# Patient Record
Sex: FEMALE | HISPANIC OR LATINO | Employment: FULL TIME | URBAN - NONMETROPOLITAN AREA
[De-identification: names, ages, dates, MRNs, and addresses within clinical notes are randomized per-mention and may not be internally consistent; named-entity substitution may affect disease eponyms.]

---

## 2017-06-27 ENCOUNTER — HISTORY (OUTPATIENT)
Dept: EMERGENCY MEDICINE | Facility: OTHER | Age: 52
End: 2017-06-27

## 2017-07-15 ENCOUNTER — HEALTH MAINTENANCE LETTER (OUTPATIENT)
Age: 52
End: 2017-07-15

## 2017-08-08 ENCOUNTER — OFFICE VISIT - GICH (OUTPATIENT)
Dept: FAMILY MEDICINE | Facility: OTHER | Age: 52
End: 2017-08-08

## 2017-08-08 ENCOUNTER — HISTORY (OUTPATIENT)
Dept: FAMILY MEDICINE | Facility: OTHER | Age: 52
End: 2017-08-08

## 2017-08-08 ENCOUNTER — HOSPITAL ENCOUNTER (OUTPATIENT)
Dept: RADIOLOGY | Facility: OTHER | Age: 52
End: 2017-08-08
Attending: PHYSICIAN ASSISTANT

## 2017-08-08 DIAGNOSIS — M54.50 LOW BACK PAIN: ICD-10-CM

## 2017-08-08 DIAGNOSIS — M25.512 PAIN IN LEFT SHOULDER: ICD-10-CM

## 2017-08-08 DIAGNOSIS — M79.645 PAIN OF FINGER OF LEFT HAND: ICD-10-CM

## 2017-08-08 ASSESSMENT — PATIENT HEALTH QUESTIONNAIRE - PHQ9: SUM OF ALL RESPONSES TO PHQ QUESTIONS 1-9: 11
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
## 2017-12-28 NOTE — PATIENT INSTRUCTIONS
Patient Information     Patient Name MRN Sex Wilma Romeo [id number] Female 1965      Patient Instructions by Azalia Art PA-C at 2017  3:45 PM     Author:  Azalia Art PA-C  Service:  (none) Author Type:  PHYS- Physician Assistant     Filed:  2017  4:48 PM  Encounter Date:  2017 Status:  Addendum     :  Azalia Art PA-C (PHYS- Physician Assistant)        Related Notes: Original Note by Azalia Art PA-C (PHYS- Physician Assistant) filed at 2017  4:47 PM            Finger pain:   Encouraged to take ibuprofen (400-600mg) for relief up to 4 times per day.  Encouraged rest and elevation.  Encouraged to use ice or heat 15 minutes at a time several times per day to decrease pain. Return to clinic in 2 weeks as necessary for persistent pain. Return to clinic with any change or worsening of symptoms.    Shoulder pain and back pain:  Encouraged to take ibuprofen (400-600mg) for relief up to 4 times per day.  Encouraged rest and elevation.  Encouraged to use ice or heat 15 minutes at a time several times per day to decrease pain. Return to clinic in 2 weeks as necessary for persistent pain. Return to clinic with any change or worsening of symptoms.  Referred to physical therapy for a consult.   Encouraged stretching exercises.
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
## 2018-01-27 VITALS
SYSTOLIC BLOOD PRESSURE: 100 MMHG | DIASTOLIC BLOOD PRESSURE: 68 MMHG | BODY MASS INDEX: 15.27 KG/M2 | HEIGHT: 62 IN | HEART RATE: 84 BPM | WEIGHT: 83 LBS

## 2018-01-29 ASSESSMENT — PATIENT HEALTH QUESTIONNAIRE - PHQ9: SUM OF ALL RESPONSES TO PHQ QUESTIONS 1-9: 11

## 2018-03-08 ENCOUNTER — DOCUMENTATION ONLY (OUTPATIENT)
Dept: FAMILY MEDICINE | Facility: OTHER | Age: 53
End: 2018-03-08

## 2018-03-08 PROBLEM — F10.10 ALCOHOL ABUSE: Status: ACTIVE | Noted: 2018-03-08

## 2018-03-08 RX ORDER — LAMOTRIGINE 200 MG/1
200 TABLET ORAL DAILY
COMMUNITY
Start: 2017-08-08

## 2018-03-08 RX ORDER — LANOLIN ALCOHOL/MO/W.PET/CERES
6 CREAM (GRAM) TOPICAL
COMMUNITY

## 2018-03-08 RX ORDER — PAROXETINE 40 MG/1
40 TABLET, FILM COATED ORAL EVERY MORNING
COMMUNITY

## 2018-03-08 RX ORDER — TRAZODONE HYDROCHLORIDE 100 MG/1
100 TABLET ORAL AT BEDTIME
COMMUNITY

## 2018-03-08 RX ORDER — ACETAMINOPHEN 325 MG/1
325-650 TABLET ORAL EVERY 4 HOURS PRN
COMMUNITY

## 2018-03-08 RX ORDER — IBUPROFEN 200 MG
200-400 TABLET ORAL EVERY 4 HOURS PRN
COMMUNITY

## 2018-03-08 RX ORDER — FOLIC ACID 1 MG/1
2 TABLET ORAL DAILY
COMMUNITY

## 2018-03-08 RX ORDER — BUSPIRONE HYDROCHLORIDE 10 MG/1
20 TABLET ORAL 3 TIMES DAILY
COMMUNITY

## 2018-03-08 RX ORDER — GUAIFENESIN/DEXTROMETHORPHAN 100-10MG/5
10 SYRUP ORAL EVERY 4 HOURS PRN
COMMUNITY

## 2018-03-08 RX ORDER — DIPHENOXYLATE HYDROCHLORIDE AND ATROPINE SULFATE 2.5; .025 MG/1; MG/1
1 TABLET ORAL DAILY
COMMUNITY

## 2019-09-25 ENCOUNTER — HOSPITAL ENCOUNTER (OUTPATIENT)
Dept: GENERAL RADIOLOGY | Facility: OTHER | Age: 54
Discharge: HOME | End: 2019-09-25
Attending: FAMILY MEDICINE | Admitting: FAMILY MEDICINE
Payer: COMMERCIAL

## 2019-09-25 ENCOUNTER — OFFICE VISIT (OUTPATIENT)
Dept: FAMILY MEDICINE | Facility: OTHER | Age: 54
End: 2019-09-25
Attending: FAMILY MEDICINE
Payer: COMMERCIAL

## 2019-09-25 VITALS
SYSTOLIC BLOOD PRESSURE: 124 MMHG | DIASTOLIC BLOOD PRESSURE: 72 MMHG | HEART RATE: 92 BPM | RESPIRATION RATE: 16 BRPM | TEMPERATURE: 98.3 F | BODY MASS INDEX: 19.84 KG/M2 | HEIGHT: 62 IN | WEIGHT: 107.8 LBS | OXYGEN SATURATION: 97 %

## 2019-09-25 DIAGNOSIS — G89.29 CHRONIC LEFT SHOULDER PAIN: ICD-10-CM

## 2019-09-25 DIAGNOSIS — M25.512 CHRONIC LEFT SHOULDER PAIN: ICD-10-CM

## 2019-09-25 DIAGNOSIS — J20.9 ACUTE BRONCHITIS, UNSPECIFIED ORGANISM: Primary | ICD-10-CM

## 2019-09-25 PROCEDURE — 99214 OFFICE O/P EST MOD 30 MIN: CPT | Performed by: FAMILY MEDICINE

## 2019-09-25 PROCEDURE — G0463 HOSPITAL OUTPT CLINIC VISIT: HCPCS

## 2019-09-25 PROCEDURE — 73030 X-RAY EXAM OF SHOULDER: CPT | Mod: LT

## 2019-09-25 PROCEDURE — G0463 HOSPITAL OUTPT CLINIC VISIT: HCPCS | Mod: 25

## 2019-09-25 RX ORDER — AZITHROMYCIN 250 MG/1
TABLET, FILM COATED ORAL
Qty: 6 TABLET | Refills: 0 | Status: SHIPPED | OUTPATIENT
Start: 2019-09-25 | End: 2019-12-31

## 2019-09-25 RX ORDER — IBUPROFEN 200 MG
200 TABLET ORAL EVERY 4 HOURS PRN
COMMUNITY
End: 2019-12-31

## 2019-09-25 RX ORDER — BUSPIRONE HYDROCHLORIDE 30 MG/1
30 TABLET ORAL 2 TIMES DAILY
Refills: 3 | COMMUNITY
Start: 2019-07-16 | End: 2019-12-31

## 2019-09-25 RX ORDER — BUPROPION HYDROCHLORIDE 150 MG/1
1 TABLET ORAL DAILY
Refills: 0 | COMMUNITY
Start: 2019-09-13 | End: 2019-12-31

## 2019-09-25 RX ORDER — PAROXETINE 40 MG/1
40 TABLET, FILM COATED ORAL DAILY
Refills: 4 | COMMUNITY
Start: 2019-09-13 | End: 2019-12-31

## 2019-09-25 RX ORDER — LAMOTRIGINE 100 MG/1
2 TABLET ORAL DAILY
Refills: 4 | COMMUNITY
Start: 2019-07-16 | End: 2019-12-31

## 2019-09-25 ASSESSMENT — PATIENT HEALTH QUESTIONNAIRE - PHQ9
5. POOR APPETITE OR OVEREATING: NOT AT ALL
SUM OF ALL RESPONSES TO PHQ QUESTIONS 1-9: 7

## 2019-09-25 ASSESSMENT — ANXIETY QUESTIONNAIRES
GAD7 TOTAL SCORE: 7
6. BECOMING EASILY ANNOYED OR IRRITABLE: NOT AT ALL
3. WORRYING TOO MUCH ABOUT DIFFERENT THINGS: SEVERAL DAYS
IF YOU CHECKED OFF ANY PROBLEMS ON THIS QUESTIONNAIRE, HOW DIFFICULT HAVE THESE PROBLEMS MADE IT FOR YOU TO DO YOUR WORK, TAKE CARE OF THINGS AT HOME, OR GET ALONG WITH OTHER PEOPLE: SOMEWHAT DIFFICULT
7. FEELING AFRAID AS IF SOMETHING AWFUL MIGHT HAPPEN: SEVERAL DAYS
5. BEING SO RESTLESS THAT IT IS HARD TO SIT STILL: NOT AT ALL
1. FEELING NERVOUS, ANXIOUS, OR ON EDGE: NEARLY EVERY DAY
2. NOT BEING ABLE TO STOP OR CONTROL WORRYING: MORE THAN HALF THE DAYS

## 2019-09-25 ASSESSMENT — MIFFLIN-ST. JEOR: SCORE: 1044.29

## 2019-09-25 ASSESSMENT — PAIN SCALES - GENERAL: PAINLEVEL: MODERATE PAIN (5)
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
## 2019-09-26 ASSESSMENT — ANXIETY QUESTIONNAIRES: GAD7 TOTAL SCORE: 7

## 2019-11-03 ENCOUNTER — HEALTH MAINTENANCE LETTER (OUTPATIENT)
Age: 54
End: 2019-11-03

## 2019-12-31 ENCOUNTER — OFFICE VISIT (OUTPATIENT)
Dept: FAMILY MEDICINE | Facility: OTHER | Age: 54
End: 2019-12-31
Attending: NURSE PRACTITIONER
Payer: COMMERCIAL

## 2019-12-31 VITALS
TEMPERATURE: 97.3 F | HEIGHT: 62 IN | WEIGHT: 109 LBS | RESPIRATION RATE: 18 BRPM | HEART RATE: 82 BPM | OXYGEN SATURATION: 98 % | SYSTOLIC BLOOD PRESSURE: 110 MMHG | DIASTOLIC BLOOD PRESSURE: 74 MMHG | BODY MASS INDEX: 20.06 KG/M2

## 2019-12-31 DIAGNOSIS — M25.561 ACUTE PAIN OF RIGHT KNEE: Primary | ICD-10-CM

## 2019-12-31 DIAGNOSIS — S83.421A SPRAIN OF LATERAL COLLATERAL LIGAMENT OF RIGHT KNEE, INITIAL ENCOUNTER: ICD-10-CM

## 2019-12-31 PROCEDURE — G0463 HOSPITAL OUTPT CLINIC VISIT: HCPCS

## 2019-12-31 PROCEDURE — 99213 OFFICE O/P EST LOW 20 MIN: CPT | Performed by: NURSE PRACTITIONER

## 2019-12-31 ASSESSMENT — ENCOUNTER SYMPTOMS
ARTHRALGIAS: 1
JOINT SWELLING: 1

## 2019-12-31 ASSESSMENT — MIFFLIN-ST. JEOR: SCORE: 1049.73

## 2019-12-31 ASSESSMENT — PAIN SCALES - GENERAL: PAINLEVEL: SEVERE PAIN (7)

## 2019-12-31 NOTE — PROGRESS NOTES
"  SUBJECTIVE:   Karen Sharpe is a 54 year old female who presents to clinic today for the following health issues:    HPI  Patient presents for evaluation of right knee pain. She slipped and came down on right knee 2 weeks. Had immediate pain, but was able to walk on it. She noted some minor swelling to the right of her knee. Pain is worse with activity and better with rest. She reports no giving away or instability of her knee. No prior surgery or injury.     There are no active problems to display for this patient.    Review of Systems   Musculoskeletal: Positive for arthralgias, gait problem and joint swelling.        OBJECTIVE:     /74 (BP Location: Right arm, Patient Position: Sitting, Cuff Size: Adult Regular)   Pulse 82   Temp 97.3  F (36.3  C) (Tympanic)   Resp 18   Ht 1.562 m (5' 1.5\")   Wt 49.4 kg (109 lb)   LMP  (LMP Unknown)   SpO2 98%   Breastfeeding No   BMI 20.26 kg/m    Body mass index is 20.26 kg/m .  Physical Exam  Constitutional:       Appearance: Normal appearance. She is normal weight.   Musculoskeletal:      Right knee: She exhibits decreased range of motion. She exhibits no swelling, no effusion, no ecchymosis, no erythema, normal alignment and no MCL laxity. Tenderness found. LCL tenderness noted. No medial joint line, no lateral joint line, no MCL and no patellar tendon tenderness noted.   Neurological:      Mental Status: She is alert.     Pain to lateral knee with extension of knee.     Diagnostic Test Results:  none     ASSESSMENT/PLAN:   1. Acute pain of right knee  - ELASTIC KNEE SUPPORT S  - PHYSICAL THERAPY REFERRAL; Future    2. Sprain of lateral collateral ligament of right knee, initial encounter  Due to length of injury and exam I did not feel x-ray of knee was necessary at this time, likely ligament sprain. Plan on elastic knee support for the next 2 weeks and consider physical therapy. Ice at the end of the day to help with inflammation. If not improved in the " next 2-3 weeks consider further imaging.     Monica Sarmiento FNP-[facility] AND [facility]

## 2019-12-31 NOTE — NURSING NOTE
Patient presents to clinic after fall onto right knee.  She is experiencing swelling and pain rated 7.    Rupa Mendoza LPN............12/31/2019 3:47 PM

## 2020-05-16 ENCOUNTER — VIRTUAL VISIT (OUTPATIENT)
Dept: FAMILY MEDICINE | Facility: OTHER | Age: 55
End: 2020-05-16

## 2020-05-17 NOTE — PROGRESS NOTES
"Date: 2020 20:59:28  Clinician: Jessy Pink  Clinician NPI: [id number]  Patient: Karen Sharpe  Patient : 1965  Patient Address: [address] [place] [address]  Patient Phone: [phone]  Visit Protocol: UTI  Patient Summary:  Karen is a 54 year old ( : 1965 ) female who initiated a Visit for a presumed bladder infection. When asked the question \"Please sign me up to receive news, health information and promotions. \", Karen responded \"No\".   Her symptoms started 1-3 days ago and consist of dysuria, feeling as if the bladder is never empty, urinary frequency, and urgency.   Symptom details   Urine color: The color of her urine is yellow.    Denied symptoms include foul-smelling urine, nausea, flank pain, abdominal pain, chills, vaginal discharge, urinary incontinence, vomiting, and vaginal itching. She does not feel feverish.   Karen has not used any over-the-counter medications or home remedies to relieve her current symptoms.  Precipitating events  Karen denies having a sexually transmitted disease.  Pertinent medical history  Karen has had a bladder infection before but has not had any in the past 12 months. Her current symptoms are similar to her previous bladder infection symptoms.   She is not sure what antibiotics have been effective in treating her past bladder infections.   Karen has not been prescribed antibiotics to prevent frequent or repeated bladder infections in the past and does not get yeast infections when she takes antibiotics. She has not experienced problems or side effects with any of the common antibiotics used to treat bladder infections.   Karen does not have a history of kidney stones. She has not used a catheter or been a patient in a hospital or nursing home in the past 2 weeks.   Karen smokes or uses smokeless tobacco.     MEDICATIONS: No current medications, ALLERGIES: NKDA  Clinician Response:  Dear Karen,  Based on the information you have provided, you " likely have an acute urinary tract infection, also called a bladder infection. Bladder infections occur when bacteria from the outside of the body enters the urinary tract. Any part of the urinary system can be infected, but the bladder is the most common.  Medication information  I am prescribing:     Nitrofurantoin monohyd/m-cryst (Macrobid) 100 mg oral capsule. Take 1 capsule by mouth every 12 hours for 5 days. Take this medication with food. There are no refills with this prescription.   The medication I prescribed for your bladder infection is an antibiotic. Continue taking the medication until it is gone even if you feel better.   Yeast infections can be a common side effect of antibiotics. The most common symptom of a yeast infection is itchiness in and around the vagina. Other signs and symptoms include burning, redness, or a thick, white vaginal discharge that looks like cottage cheese and does not have a bad smell.  Self care  Urination helps to flush bacteria from the urinary tract. For this reason, drinking water and urinating often helps relieve some urinary symptoms and can decrease your risk of getting bladder infections in the future.  Other steps you can take to prevent future bladder infections include:     Wipe front to back after using the bathroom    Urinate after sexual intercourse    Avoid using deodorant sprays, douches, or powders in the vaginal area     Becoming tobacco-free is one of the best things you can do to improve your health. For help with quitting tobacco, you can call 1-489-QUIT-NOW (1-654.680.9890) or visit smokefree.gov.  When to seek care  Please make an appointment to be seen in a clinic or urgent care if any of the following occur:     You develop new symptoms or your symptoms become worse    You have medication side effects that make it difficult to take them as prescribed    Your symptoms do not improve within 1-2 days of starting treatment    You have symptoms of a  bladder infection that return shortly after completing treatment     It is possible to have an allergic reaction to an antibiotic even if you have not had one in the past. If you notice a new rash, significant swelling, or difficulty breathing, stop taking this medication immediately and go to a clinic or urgent care.   Diagnosis: Acute uncomplicated bladder infection  Diagnosis ICD: N39.0  Prescription: nitrofurantoin monohyd/m-cryst (Macrobid) 100 mg oral capsule 10 capsule, 5 days supply. Take 1 capsule by mouth every 12 hours for 5 days. Refills: 0, Refill as needed: no, Allow substitutions: yes  Pharmacy: [facility] #[id number] - [phone] - [address] [address]

## 2020-11-16 ENCOUNTER — HEALTH MAINTENANCE LETTER (OUTPATIENT)
Age: 55
End: 2020-11-16

## 2021-02-07 ENCOUNTER — HEALTH MAINTENANCE LETTER (OUTPATIENT)
Age: 56
End: 2021-02-07

## 2021-09-18 ENCOUNTER — HEALTH MAINTENANCE LETTER (OUTPATIENT)
Age: 56
End: 2021-09-18

## 2022-01-02 ENCOUNTER — HEALTH MAINTENANCE LETTER (OUTPATIENT)
Age: 57
End: 2022-01-02

## 2022-02-27 ENCOUNTER — HEALTH MAINTENANCE LETTER (OUTPATIENT)
Age: 57
End: 2022-02-27

## 2022-11-19 ENCOUNTER — HEALTH MAINTENANCE LETTER (OUTPATIENT)
Age: 57
End: 2022-11-19

## 2023-04-09 ENCOUNTER — HEALTH MAINTENANCE LETTER (OUTPATIENT)
Age: 58
End: 2023-04-09

## 2024-09-28 ENCOUNTER — HOSPITAL ENCOUNTER (EMERGENCY)
Facility: OTHER | Age: 59
Discharge: HOME | End: 2024-09-28
Attending: EMERGENCY MEDICINE

## 2024-09-28 ENCOUNTER — APPOINTMENT (OUTPATIENT)
Dept: GENERAL RADIOLOGY | Facility: OTHER | Age: 59
End: 2024-09-28
Attending: EMERGENCY MEDICINE

## 2024-09-28 VITALS
DIASTOLIC BLOOD PRESSURE: 55 MMHG | RESPIRATION RATE: 18 BRPM | HEIGHT: 62 IN | OXYGEN SATURATION: 99 % | SYSTOLIC BLOOD PRESSURE: 105 MMHG | HEART RATE: 88 BPM | TEMPERATURE: 97.8 F | WEIGHT: 105 LBS | BODY MASS INDEX: 19.32 KG/M2

## 2024-09-28 DIAGNOSIS — S52.502A CLOSED FRACTURE OF DISTAL END OF LEFT RADIUS, UNSPECIFIED FRACTURE MORPHOLOGY, INITIAL ENCOUNTER: ICD-10-CM

## 2024-09-28 PROCEDURE — 99284 EMERGENCY DEPT VISIT MOD MDM: CPT | Mod: 25 | Performed by: EMERGENCY MEDICINE

## 2024-09-28 PROCEDURE — 29125 APPL SHORT ARM SPLINT STATIC: CPT | Performed by: EMERGENCY MEDICINE

## 2024-09-28 PROCEDURE — 99283 EMERGENCY DEPT VISIT LOW MDM: CPT | Mod: 25 | Performed by: EMERGENCY MEDICINE

## 2024-09-28 PROCEDURE — 250N000011 HC RX IP 250 OP 636: Performed by: EMERGENCY MEDICINE

## 2024-09-28 PROCEDURE — 73110 X-RAY EXAM OF WRIST: CPT | Mod: LT

## 2024-09-28 PROCEDURE — 96372 THER/PROPH/DIAG INJ SC/IM: CPT | Performed by: EMERGENCY MEDICINE

## 2024-09-28 RX ORDER — HYDROCODONE BITARTRATE AND ACETAMINOPHEN 5; 325 MG/1; MG/1
1 TABLET ORAL EVERY 6 HOURS PRN
Qty: 6 TABLET | Refills: 0 | Status: SHIPPED | OUTPATIENT
Start: 2024-09-28

## 2024-09-28 RX ORDER — MORPHINE SULFATE 4 MG/ML
4 INJECTION, SOLUTION INTRAMUSCULAR; INTRAVENOUS ONCE
Status: DISCONTINUED | OUTPATIENT
Start: 2024-09-28 | End: 2024-09-28

## 2024-09-28 RX ORDER — MORPHINE SULFATE 4 MG/ML
4 INJECTION, SOLUTION INTRAMUSCULAR; INTRAVENOUS ONCE
Status: COMPLETED | OUTPATIENT
Start: 2024-09-28 | End: 2024-09-28

## 2024-09-28 RX ADMIN — MORPHINE SULFATE 4 MG: 4 INJECTION, SOLUTION INTRAMUSCULAR; INTRAVENOUS at 15:29

## 2024-09-28 ASSESSMENT — ENCOUNTER SYMPTOMS
NAUSEA: 0
ARTHRALGIAS: 1
AGITATION: 0
FEVER: 0
CHILLS: 0
CHEST TIGHTNESS: 0
SHORTNESS OF BREATH: 0
LIGHT-HEADEDNESS: 0
DYSURIA: 0
VOMITING: 0
WOUND: 0

## 2024-09-28 ASSESSMENT — COLUMBIA-SUICIDE SEVERITY RATING SCALE - C-SSRS
6. HAVE YOU EVER DONE ANYTHING, STARTED TO DO ANYTHING, OR PREPARED TO DO ANYTHING TO END YOUR LIFE?: NO
1. IN THE PAST MONTH, HAVE YOU WISHED YOU WERE DEAD OR WISHED YOU COULD GO TO SLEEP AND NOT WAKE UP?: NO
2. HAVE YOU ACTUALLY HAD ANY THOUGHTS OF KILLING YOURSELF IN THE PAST MONTH?: NO

## 2024-09-28 ASSESSMENT — ACTIVITIES OF DAILY LIVING (ADL): ADLS_ACUITY_SCORE: 35

## 2024-09-28 NOTE — ED TRIAGE NOTES
Pt states she fell down a few stairs last night as she was helping her  up hit her head on the bottom of the stairway that was carpeted, bruises and bleeding to her nose, pt complains of both arms hurting along with being dizzy. Acknowledges alcohol use last evening.     Triage Assessment (Adult)       Row Name 09/28/24 1511          Triage Assessment    Airway WDL WDL        Respiratory WDL    Respiratory WDL WDL        Peripheral/Neurovascular WDL    Peripheral Neurovascular WDL WDL        Cognitive/Neuro/Behavioral WDL    Cognitive/Neuro/Behavioral WDL WDL

## 2024-09-28 NOTE — ED PROVIDER NOTES
History     Chief Complaint   Patient presents with    Fall     Pt states she was helping her  get up and she fell down a few stairs and hit her head on the bottom floor which was carpeted, this happened last evening she went to bed and decided she should go in because she was in pain and dizzy.     HPI  Karen Sharpe is a 59 year old female who comes in complaining mostly of left wrist pain.  Last night when she and her  were going to bed, she was helping him up the stairs when she lost her balance and fell back down the stairs.  They were perhaps 6 steps up from the bottom.  She turned and fell face first.  She wanted hitting her face has an abrasion around her nose and had nosebleed.  She did not lose consciousness.  She was able to get up and go back upstairs and went to bed.  She slept normally.  This morning when she woke up she was having this pain mostly in the left wrist but also some in the right wrist.  She states she has been been unable to control the pain.  She has taken ibuprofen 400 mg 3 times today for total of 1200 mg.  Has not provided any relief.  Pain is starting to radiate up towards her elbow.  Her fingers on her left hand are numb and tingly.  She states she did not lose consciousness.  She has been mentating clearly.  Slept normally and woke up easily.  No nausea or vomiting.  She had 1 slight episode of lightheadedness on her way in here but that has resolved.    Allergies:  Allergies   Allergen Reactions    Compazine [Prochlorperazine] Other (See Comments)     Jaw goes out of joint.    Oxycodone Nausea and Unknown    Prochlorperazine      Other reaction(s): Extrapyramidal Side Effect       Problem List:    Patient Active Problem List    Diagnosis Date Noted    Alcohol abuse 03/08/2018     Priority: Medium    Alcohol intoxication delirium with moderate or severe use disorder (H) 11/22/2016     Priority: Medium    Generalized anxiety disorder 10/06/2015     Priority: Medium      Diagnosis updated by automated process. Provider to review and confirm.      Dysmenorrhea 2013     Priority: Medium    Dyspareunia 2013     Priority: Medium    Anxiety 2013     Priority: Medium    Mild major depression (H) 2013     Priority: Medium    Fibroadenoma of right breast 2013     Priority: Medium     Mammogram done  and biopsy confirmed diagnosis, benign breast lump right breast 7 o'clock.  Luz Sandhu FNRAUL        Iron deficiency anemia 2011     Priority: Medium    Tick bite 2011     Priority: Medium    Weakness 2011     Priority: Medium     Diagnosis updated by automated process. Provider to review and confirm.      Joint ache 2011     Priority: Medium    CARDIOVASCULAR SCREENING; LDL GOAL LESS THAN 160 10/31/2010     Priority: Medium    Seizure disorder (H) 2009     Priority: Medium     Resolved one time only in    No more since then   Jhonny Bernabe MD          Past Medical History:    No past medical history on file.    Past Surgical History:    No past surgical history on file.    Family History:    No family history on file.    Social History:  Marital Status:   [2]  Social History     Tobacco Use    Smoking status: Former     Current packs/day: 0.00     Average packs/day: 0.3 packs/day for 15.0 years (3.8 ttl pk-yrs)     Types: Cigarettes     Start date: 1989     Quit date: 2004     Years since quittin.0    Smokeless tobacco: Never   Substance Use Topics    Alcohol use: Not Currently     Comment: Alcoholic Drinks/day: drinks [place] states takes sips    Drug use: Never     Types: Other     Comment: Drug use: No        Medications:    acetaminophen (TYLENOL) 325 MG tablet  busPIRone (BUSPAR) 10 MG tablet  calcium carbonate antacid (PX ANTACID MAXIMUM STRENGTH) 1000 MG CHEW  diazepam (VALIUM) 5 MG tablet  diclofenac (VOLTAREN) 1 % GEL  folic acid (FOLVITE) 1 MG tablet  guaiFENesin-dextromethorphan  "(ROBITUSSIN DM) 100-10 MG/5ML syrup  ibuprofen (ADVIL) 200 MG tablet  ibuprofen (ADVIL/MOTRIN) 200 MG tablet  lamoTRIgine (LAMICTAL) 200 MG tablet  melatonin 3 MG tablet  Multiple Vitamin (MULTI-VITAMINS) TABS  PARoxetine (PAXIL) 20 MG tablet  PARoxetine (PAXIL) 40 MG tablet  traZODone (DESYREL) 100 MG tablet  HYDROcodone-acetaminophen (NORCO) 5-325 MG tablet          Review of Systems   Constitutional:  Negative for chills and fever.   HENT:  Positive for nosebleeds. Negative for congestion.    Eyes:  Negative for visual disturbance.   Respiratory:  Negative for chest tightness and shortness of breath.    Cardiovascular:  Negative for chest pain.   Gastrointestinal:  Negative for nausea and vomiting.   Genitourinary:  Negative for dysuria.   Musculoskeletal:  Positive for arthralgias.   Skin:  Negative for wound.   Neurological:  Negative for light-headedness.   Psychiatric/Behavioral:  Negative for agitation.        Physical Exam   BP: 105/55  Pulse: 88  Temp: 97.8  F (36.6  C)  Resp: 18  Height: 156.2 cm (5' 1.5\")  Weight: 47.6 kg (105 lb)  SpO2: 99 %      Physical Exam  Vitals and nursing note reviewed.   Constitutional:       Appearance: Normal appearance.   HENT:      Head: Normocephalic and atraumatic.      Mouth/Throat:      Mouth: Mucous membranes are moist.   Eyes:      Conjunctiva/sclera: Conjunctivae normal.   Cardiovascular:      Rate and Rhythm: Normal rate and regular rhythm.      Heart sounds: Normal heart sounds.   Pulmonary:      Effort: Pulmonary effort is normal.      Breath sounds: Normal breath sounds.   Abdominal:      General: Abdomen is flat.   Musculoskeletal:      Comments: Patient's left wrist with significant swelling male over the distal radius and wrist area.  Very tender to palpation.  Proximal to the wrist appears normal but is somewhat tender to palpation over the proximal third of the distal radius.  Fingers do not appear swollen, they have good color.  When I touch them she winces " little bit and says they are very tingly.   Skin:     General: Skin is warm and dry.   Neurological:      Mental Status: She is alert and oriented to person, place, and time.   Psychiatric:         Mood and Affect: Mood normal.         Behavior: Behavior normal.         ED Course        Procedures         Patient with very minimally displaced somewhat impacted distal radius fracture.  She is placed in a short arm splint with a preformed aluminum form wrist splint as well as a posterior fiberglass portion.  Also given a sling.  She can take ibuprofen 6 to 800 mg every 6 hours for pain.  Also sent home with a small prescription for hydrocodone.  Orthopedic referral placed as well.  Return if worse.         Results for orders placed or performed during the hospital encounter of 09/28/24 (from the past 24 hour(s))   XR Wrist Port Left G/E 3 Views    Narrative    PROCEDURE:  XR WRIST PORT LEFT G/E 3 VIEWS    HISTORY: fall    COMPARISON: Radiographs 11/5/2012, 5/14/2012    TECHNIQUE:  XR WRIST PORT LEFT 3 VIEWS    FINDINGS:    Acute, mildly impacted Colles' type fracture of the distal radius with  mild dorsal angulation. No definite intra-articular extension. Chronic  fracture through the ulna styloid. No suspicious osseous lesion. The  joint spaces are preserved.     No foreign body or subcutaneous emphysema.        Impression    IMPRESSION:    Acute mildly impacted comminuted fracture of the distal radius with  mild dorsal angulation.    WADE HATCH MD         SYSTEM ID:  [id number]       Medications   morphine (PF) injection 4 mg (4 mg Intramuscular $Given 9/28/24 1529)       Assessments & Plan (with Medical Decision Making)     I have reviewed the nursing notes.    I have reviewed the findings, diagnosis, plan and need for follow up with the patient.        New Prescriptions    HYDROCODONE-ACETAMINOPHEN (NORCO) 5-325 MG TABLET    Take 1 tablet by mouth every 6 hours as needed for severe pain.       Final diagnoses:    Closed fracture of distal end of left radius, unspecified fracture morphology, initial encounter       9/28/2024   [facility]       Shaun Quesada MD  09/28/24 2482

## 2024-10-03 ENCOUNTER — OFFICE VISIT (OUTPATIENT)
Dept: FAMILY MEDICINE | Facility: OTHER | Age: 59
End: 2024-10-03
Attending: EMERGENCY MEDICINE

## 2024-10-03 ENCOUNTER — HOSPITAL ENCOUNTER (OUTPATIENT)
Dept: GENERAL RADIOLOGY | Facility: OTHER | Age: 59
Discharge: HOME | End: 2024-10-03
Attending: FAMILY MEDICINE

## 2024-10-03 VITALS
TEMPERATURE: 97.3 F | BODY MASS INDEX: 18.96 KG/M2 | WEIGHT: 102 LBS | RESPIRATION RATE: 16 BRPM | SYSTOLIC BLOOD PRESSURE: 122 MMHG | OXYGEN SATURATION: 98 % | HEART RATE: 78 BPM | DIASTOLIC BLOOD PRESSURE: 72 MMHG

## 2024-10-03 DIAGNOSIS — S52.532A CLOSED COLLES' FRACTURE OF LEFT RADIUS, INITIAL ENCOUNTER: Primary | ICD-10-CM

## 2024-10-03 PROCEDURE — 73110 X-RAY EXAM OF WRIST: CPT | Mod: LT

## 2024-10-03 PROCEDURE — 99214 OFFICE O/P EST MOD 30 MIN: CPT | Performed by: FAMILY MEDICINE

## 2024-10-03 RX ORDER — DICLOFENAC SODIUM 75 MG/1
75 TABLET, DELAYED RELEASE ORAL 2 TIMES DAILY PRN
Qty: 40 TABLET | Refills: 0 | Status: SHIPPED | OUTPATIENT
Start: 2024-10-03

## 2024-10-03 ASSESSMENT — PAIN SCALES - GENERAL: PAINLEVEL: WORST PAIN (10)

## 2024-10-03 NOTE — PROGRESS NOTES
Sports Medicine Office Note    HPI:  59-year-old female coming in for evaluation of a left wrist injury that took place on 9/28.  She fell forward onto an outstretched hand as she reached the landing at the top of the staircase.  She had immediate pain.  She was seen in the ER and found to have a distal radius fracture.  She was placed in a splint.  She did not like the way this felt so she took it off that same day.  She has been using a wrist brace of her 's.  She has significant pain about the wrist, extending into the radial 3 digits of the hand.  She also has numbness that extend into the radial 2 digits.  She rates the pain at 10/10.  She characterized the pain as sharp, stabbing, throbbing, and burning.  She has been using heat, ice, OTC ibuprofen, and oxycodone to help with her symptoms.  She has associated swelling and bruising.  No previous injuries to this wrist.      EXAM:  /72 (BP Location: Right arm, Patient Position: Sitting, Cuff Size: Adult Regular)   Pulse 78   Temp 97.3  F (36.3  C) (Temporal)   Resp 16   Wt 46.3 kg (102 lb)   LMP  (LMP Unknown)   SpO2 98%   BMI 18.96 kg/m    MUSCULOSKELETAL EXAM:  LEFT WRIST  Inspection:  -No gross deformity  -Moderate bruising and swelling about the distal forearm, wrist, and dorsal hand  -Scars:  None    Tenderness:  - Positive to the distal radius, radial hand, and thumb and second digit    Range of Motion:  - Limited due to pain    Strength:  - strength:  3/5  -Wrist extension:  3/5    Sensation:  -Intact to light touch in the radial, median, and ulnar nerve distributions    Motor:  - Prominent weakness with , thumb/index finger pull-through, finger abduction, and wrist extension; difficult to know if this is true weakness or pain inhibition    Other:  -No signs of cyanosis. Normal skin temperature of the upper extremity.  -Elbow:  No gross deformity. Full range of motion.  -Right wrist:  No gross deformity. No palpable tenderness.  Normal strength and ROM.      IMAGIN2024: 3 view left wrist x-ray  - Impacted, mild apex volarly angulated distal radius fracture.  Mild degenerative changes of the carpus.  Chronic ulnar styloid fracture deformity.    10/3/2024: 3 view left wrist x-ray  - Distal radius fracture is again noted.  Dorsal angulation remains present.      ASSESSMENT/PLAN:  Diagnoses and all orders for this visit:  Closed Colles' fracture of left radius, initial encounter  -     XR Wrist Left G/E 3 Views  -     Orthopedic  Referral  -     diclofenac (VOLTAREN) 75 MG EC tablet; Take 1 tablet (75 mg) by mouth 2 times daily as needed for moderate pain. Take with food.  Do not take with other nsaids.    59-year-old female with a closed, nondisplaced, mild apex volarly angulated left distal radius fracture.  X-rays from  and 10/3 were both personally reviewed in the office with the findings as demonstrated above by my interpretation.  The level of angulation seems adequate for nonoperative management, however, she does have significant weakness with findings that may suggest median nerve involvement.  With that, I feel orthopedic consultation is warranted.  - Patient was given the option for evaluation and possible surgery on 10/4, patient declined  - Arrange for patient to have consultation appointment on 10/9  - Patient was placed in a short arm splint  - Ice and OTC analgesics as needed  - Diclofenac 75 mg twice daily as needed  - For operative management, orthopedics to assume care  - For nonsurgical management, okay for patient to follow-up in this office      James Louise MD  10/3/2024  1:10 PM    Total time spent with this patient was 38 minutes which included chart review, visualization and independent interpretation of images, time spent with the patient, and documentation.    Procedure time:  0 minute(s)

## 2024-11-03 ENCOUNTER — HEALTH MAINTENANCE LETTER (OUTPATIENT)
Age: 59
End: 2024-11-03

## (undated) RX ORDER — MORPHINE SULFATE 4 MG/ML
INJECTION, SOLUTION INTRAMUSCULAR; INTRAVENOUS
Status: DISPENSED
Start: 2024-09-28